# Patient Record
Sex: FEMALE | Race: BLACK OR AFRICAN AMERICAN | NOT HISPANIC OR LATINO | URBAN - METROPOLITAN AREA
[De-identification: names, ages, dates, MRNs, and addresses within clinical notes are randomized per-mention and may not be internally consistent; named-entity substitution may affect disease eponyms.]

---

## 2019-06-19 ENCOUNTER — OUTPATIENT (OUTPATIENT)
Dept: OUTPATIENT SERVICES | Facility: HOSPITAL | Age: 44
LOS: 1 days | Discharge: HOME | End: 2019-06-19

## 2019-06-19 DIAGNOSIS — Z02.1 ENCOUNTER FOR PRE-EMPLOYMENT EXAMINATION: ICD-10-CM

## 2021-02-25 ENCOUNTER — EMERGENCY (EMERGENCY)
Facility: HOSPITAL | Age: 46
LOS: 0 days | Discharge: HOME | End: 2021-02-25
Attending: EMERGENCY MEDICINE | Admitting: EMERGENCY MEDICINE
Payer: COMMERCIAL

## 2021-02-25 VITALS
RESPIRATION RATE: 18 BRPM | TEMPERATURE: 97 F | HEART RATE: 97 BPM | OXYGEN SATURATION: 98 % | DIASTOLIC BLOOD PRESSURE: 98 MMHG | SYSTOLIC BLOOD PRESSURE: 175 MMHG | WEIGHT: 160.06 LBS

## 2021-02-25 VITALS
HEART RATE: 86 BPM | DIASTOLIC BLOOD PRESSURE: 86 MMHG | RESPIRATION RATE: 18 BRPM | OXYGEN SATURATION: 100 % | SYSTOLIC BLOOD PRESSURE: 164 MMHG

## 2021-02-25 DIAGNOSIS — R51.9 HEADACHE, UNSPECIFIED: ICD-10-CM

## 2021-02-25 PROCEDURE — 99284 EMERGENCY DEPT VISIT MOD MDM: CPT

## 2021-02-25 RX ORDER — SODIUM CHLORIDE 9 MG/ML
1000 INJECTION, SOLUTION INTRAVENOUS ONCE
Refills: 0 | Status: COMPLETED | OUTPATIENT
Start: 2021-02-25 | End: 2021-02-25

## 2021-02-25 RX ORDER — METOCLOPRAMIDE HCL 10 MG
1 TABLET ORAL
Qty: 21 | Refills: 0
Start: 2021-02-25 | End: 2021-03-03

## 2021-02-25 RX ORDER — METOCLOPRAMIDE HCL 10 MG
10 TABLET ORAL ONCE
Refills: 0 | Status: COMPLETED | OUTPATIENT
Start: 2021-02-25 | End: 2021-02-25

## 2021-02-25 RX ORDER — DIPHENHYDRAMINE HCL 50 MG
25 CAPSULE ORAL ONCE
Refills: 0 | Status: COMPLETED | OUTPATIENT
Start: 2021-02-25 | End: 2021-02-25

## 2021-02-25 RX ORDER — KETOROLAC TROMETHAMINE 30 MG/ML
15 SYRINGE (ML) INJECTION ONCE
Refills: 0 | Status: DISCONTINUED | OUTPATIENT
Start: 2021-02-25 | End: 2021-02-25

## 2021-02-25 RX ADMIN — Medication 10 MILLIGRAM(S): at 05:05

## 2021-02-25 RX ADMIN — Medication 15 MILLIGRAM(S): at 05:05

## 2021-02-25 RX ADMIN — SODIUM CHLORIDE 1000 MILLILITER(S): 9 INJECTION, SOLUTION INTRAVENOUS at 05:04

## 2021-02-25 RX ADMIN — Medication 25 MILLIGRAM(S): at 05:05

## 2021-02-25 NOTE — ED PROVIDER NOTE - PATIENT PORTAL LINK FT
You can access the FollowMyHealth Patient Portal offered by [facility] by registering at the following website: [email or web address]. By joining [employer]’s FollowMyHealth portal, you will also be able to view your health information using other applications (apps) compatible with our system.

## 2021-02-25 NOTE — ED PROVIDER NOTE - CARE PROVIDER_API CALL
Narendra Holcomb)  EEGEpilepsy; Neurology  [address] [place] [address], [address]  [place] [address]  Phone: [phone]  Fax: [phone]  Follow Up Time:

## 2021-02-25 NOTE — ED PROVIDER NOTE - NS ED ROS FT
Constitutional:  No fevers or chills.  Eyes:  No visual changes.  ENT:  No hearing changes. No sore throat.  Neck:  No neck pain or stiffness.  Cardiac:  No CP or edema.  Resp:  No cough or SOB.   GI:  Mild nausea. No vomiting, diarrhea, or abdominal pain.  :  No dysuria, frequency, or hematuria.  MSK:  No myalgias or joint pain/swelling.  Neuro:  +HA. No dizziness or weakness.  Skin:  No skin rash.

## 2021-02-25 NOTE — ED PROVIDER NOTE - ATTENDING CONTRIBUTION TO CARE
45F no pmh p/w HA x 3d. Mild bifrontal HA, waxing/waning, accomp by mild nausea & photophobia, partially relieved w/excedrin. Rpts h/o similar HAs x many years, has never seen a neurologist. Denies f/c, uri sx, vision changes, cp/sob, abd pain, focal numbness or weakness. No early morning sx. No falls or head trauma.    PE:  nad  skin warm, dry  ncat  neck supple  rrr nl s1s2 no mrg  ctab no wrr  abd soft ntnd no palpable masses no rgr  back non-tender no cvat  ext no cce dpi  neuro aaox3, cn 2-12 intact bl no nystagmus or facial droop, 5/5 strength x 4 no drift, gross sensation intact, finger-nose nl, gait nl, romberg neg

## 2021-02-25 NOTE — ED PROVIDER NOTE - OBJECTIVE STATEMENT
46yo F 46yo F with PMH of HAs 44yo F with PMH of HAs presenting to ED with mild frontal HA x 1-2 days. Patient state she takes Excedrin with HA relief although it did not fully relieve her symptoms tonight so came to ED. Not worst HA of life, gradual, throbbing, mild to moderate. Mild nausea, mild photophobia/photosensitivity. No vision changes, HA, CP, SOB, v/d, abd pain, back pain, neck pain/rigidity, paresthesias, focal weakness, or injuries/traumas/falls/syncope. Nonsmoker.

## 2021-02-25 NOTE — ED PROVIDER NOTE - NSFOLLOWUPINSTRUCTIONS_ED_ALL_ED_FT
Please follow-up with the neurologist provided, call today to make an appointment.      Headache    A headache is pain or discomfort felt around the head or neck area. The specific cause of a headache may not be found as there are many types including tension headaches, migraine headaches, and cluster headaches. Watch your condition for any changes. Things you can do to manage your pain include taking over the counter and prescription medications as instructed by your health care provider, lying down in a dark quiet room, limiting stress, getting regular sleep, and refraining from alcohol and tobacco products.    SEEK IMMEDIATE MEDICAL CARE IF YOU HAVE ANY OF THE FOLLOWING SYMPTOMS: fever, vomiting, stiff neck, loss of vision, problems with speech, muscle weakness, loss of balance, trouble walking, passing out, or confusion.

## 2021-02-25 NOTE — ED PROVIDER NOTE - PROGRESS NOTE DETAILS
Canelo- Patient much improved after medications. Sent rx for Reglan to pt's pharmacy and given neuro f/u. Strict return precaution signs/sxs discussed. Full DC instructions and precaution signs and symptoms discussed. Proper follow up ensured.  Medications administered and prescribed/OTC home meds discussed.  All questions and concerns from patient addressed.  Understanding of instructions verbalized. Canelo- Patient improved after medications, pain fully resolved. Sent rx for Reglan to pt's pharmacy and given neuro f/u. Strict return precaution signs/sxs discussed. Full DC instructions and precaution signs and symptoms discussed. Proper follow up ensured.  Medications administered and prescribed/OTC home meds discussed.  All questions and concerns from patient addressed.  Understanding of instructions verbalized.

## 2021-02-26 PROBLEM — Z00.00 ENCOUNTER FOR PREVENTIVE HEALTH EXAMINATION: Status: ACTIVE | Noted: 2021-02-26

## 2021-05-18 ENCOUNTER — APPOINTMENT (OUTPATIENT)
Dept: OBGYN | Facility: CLINIC | Age: 46
End: 2021-05-18

## 2021-09-22 ENCOUNTER — APPOINTMENT (OUTPATIENT)
Dept: NEUROLOGY | Facility: CLINIC | Age: 46
End: 2021-09-22
Payer: COMMERCIAL

## 2021-09-22 VITALS
DIASTOLIC BLOOD PRESSURE: 93 MMHG | TEMPERATURE: 97.1 F | SYSTOLIC BLOOD PRESSURE: 147 MMHG | BODY MASS INDEX: 29.99 KG/M2 | HEART RATE: 82 BPM | WEIGHT: 180 LBS | HEIGHT: 65 IN

## 2021-09-22 DIAGNOSIS — R51.9 HEADACHE, UNSPECIFIED: ICD-10-CM

## 2021-09-22 DIAGNOSIS — Z78.9 OTHER SPECIFIED HEALTH STATUS: ICD-10-CM

## 2021-09-22 PROCEDURE — 99203 OFFICE O/P NEW LOW 30 MIN: CPT

## 2021-09-22 RX ORDER — BUTALBITAL, ACETAMINOPHEN AND CAFFEINE 300; 50; 40 MG/1; MG/1; MG/1
50-300-40 CAPSULE ORAL TWICE DAILY
Qty: 28 | Refills: 2 | Status: ACTIVE | COMMUNITY
Start: 2021-09-22 | End: 1900-01-01
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
## 2021-09-22 NOTE — DISCUSSION/SUMMARY
[FreeTextEntry1] : Ms. Geronimo is a 45yo woman with headaches which are severe requiring ED visits for the last 2 years.  Headaches have features suggestive of migraines however as they donot occur more then 1-2 days per month would only offer abortive medications. Will also obtain imaging of brain to rule out any other etiologies triggering her HA\par 1. MRI brain w/o MARI\par 2. Fioricet PRN\par 3. f/u in 6 months\par 4. Headache diary

## 2023-04-10 ENCOUNTER — NON-APPOINTMENT (OUTPATIENT)
Age: 48
End: 2023-04-10

## 2023-04-10 ENCOUNTER — APPOINTMENT (OUTPATIENT)
Dept: ORTHOPEDIC SURGERY | Facility: CLINIC | Age: 48
End: 2023-04-10
Payer: OTHER MISCELLANEOUS

## 2023-04-10 VITALS — WEIGHT: 180 LBS | BODY MASS INDEX: 29.99 KG/M2 | HEIGHT: 65 IN

## 2023-04-10 DIAGNOSIS — Z78.9 OTHER SPECIFIED HEALTH STATUS: ICD-10-CM

## 2023-04-10 DIAGNOSIS — S73.102A UNSPECIFIED SPRAIN OF LEFT HIP, INITIAL ENCOUNTER: ICD-10-CM

## 2023-04-10 PROCEDURE — 99203 OFFICE O/P NEW LOW 30 MIN: CPT | Mod: ACP

## 2023-04-10 PROCEDURE — 73140 X-RAY EXAM OF FINGER(S): CPT | Mod: LT

## 2023-04-10 PROCEDURE — 73030 X-RAY EXAM OF SHOULDER: CPT | Mod: LT

## 2023-04-10 PROCEDURE — 73502 X-RAY EXAM HIP UNI 2-3 VIEWS: CPT

## 2023-04-10 RX ORDER — TIZANIDINE 4 MG/1
4 TABLET ORAL
Qty: 30 | Refills: 0 | Status: ACTIVE | COMMUNITY
Start: 2023-04-10 | End: 1900-01-01

## 2023-04-10 RX ORDER — NABUMETONE 500 MG/1
500 TABLET, FILM COATED ORAL
Qty: 60 | Refills: 0 | Status: ACTIVE | COMMUNITY
Start: 2023-04-10 | End: 1900-01-01

## 2023-04-10 NOTE — IMAGING
[de-identified] : On examination of her left shoulder she is tender over the anterior glenohumeral joint.  She is nontender over the AC joint or clavicle.  She is nontender over the trapezius muscle or the scapula.  She has good range of motion of the shoulder with pain.  Negative drop arm, positive speeds, positive impingement.  Negative Lin, she has 4-5 strength, sensation is intact throughout, 2+ radial pulse.  Full range of motion of the left elbow wrist and hand.  No swelling or ecchymosis of the left pinky finger.  There is mild tenderness to palpation over the PIP joint.  He is able to flex and extend at the MCP PIP and DIP joints.  There is no rotational deformity.\par Examination of her left hip she has mild tenderness to palpation over the groin and the greater trochanter.  Negative logroll.  She has full range of motion of the hip, pain with internal rotation.  No tenderness over the lumbar spine or the paraspinal muscles.  She is able to straight leg raise against resistance without pain.  She has 5 out of 5 strength in the lower extremities, sensation is intact throughout the lower extremities, no saddle anesthesia.  She is walking with a mildly antalgic gait.\par \par X-rays taken in the office today of the left shoulder show no acute fractures, dislocations, or other bony abnormalities.\par X-rays taken in the office today of the left hip show no acute fractures, dislocations, or other bony abnormalities.\par X-rays taken in the office today of the left pinky finger show no acute fractures, dislocations, or other bony abnormality

## 2023-04-10 NOTE — DISCUSSION/SUMMARY
[de-identified] : At this time I recommend she rest, ice and alternate with warm compresses.  I sent a prescription for an anti-inflammatory and a muscle relaxant to the pharmacy.  I would like her to start doing some physical therapy.  We will see her back in a few weeks for repeat evaluation to make sure she is improving. Patient will call me if any other problems or concerns.  Patient verbalized understanding and agreed with the plan, all questions were answered in the office today.\par \par This is a Worker's Compensation case, she is unable to work at this time.  She is currently on temporary total disability.\par Patient was seen under the supervision of Dr. Vasques.

## 2023-04-10 NOTE — HISTORY OF PRESENT ILLNESS
[de-identified] : 48-year-old female is here today for evaluation of her left shoulder left hip and left pinky finger.  Patient states she was at work, she works in a group home, she states she was going down the steps to do the laundry and she fell down a flight of steps.  She states she fell down about 10 steps.  He went to [place] and had x-rays taken and was advised to follow-up here.  States he took x-rays of her finger and her left knee but she states she is having pain now mostly in her left shoulder and left hip.  She states she feels the pain in her left hip radiating down her left leg.  She still has some mild pain in the left pinky finger.  She has not been taking anything for the pain.  She denies any lower back pain, she denies any numbness tingling in the legs.

## 2023-05-01 ENCOUNTER — APPOINTMENT (OUTPATIENT)
Dept: ORTHOPEDIC SURGERY | Facility: CLINIC | Age: 48
End: 2023-05-01
Payer: OTHER MISCELLANEOUS

## 2023-05-01 ENCOUNTER — NON-APPOINTMENT (OUTPATIENT)
Age: 48
End: 2023-05-01

## 2023-05-01 DIAGNOSIS — S43.492A OTHER SPRAIN OF LEFT SHOULDER JOINT, INITIAL ENCOUNTER: ICD-10-CM

## 2023-05-01 PROCEDURE — 99214 OFFICE O/P EST MOD 30 MIN: CPT

## 2023-05-01 RX ORDER — MELOXICAM 15 MG/1
15 TABLET ORAL DAILY
Qty: 30 | Refills: 1 | Status: ACTIVE | COMMUNITY
Start: 2023-05-01 | End: 1900-01-01

## 2023-05-01 NOTE — HISTORY OF PRESENT ILLNESS
[de-identified] : CC Left shoulder \par \par 48 year old female presents left shoulder pain. Pt is out of work. Pt states the injury happened at work, which occurred on 04/06/2023, she works in a group home. Pt states she fell down the stairs going to do laundry, which the staircase had 10 steps. \par \par on exam left hip nontender with axial loading and rotation left pinky full range of motion nontender no swelling\par \par Left shoulder guarded range of motion pain with cuff resistance impingement and Mills's\par \par recommending a formal physical therapy with a home program, an MRI of the left shoulder as well as an anti-inflammatory.  She is unable to work in a percent temporary disabled we will see her back in 4 to 6 weeks

## 2023-05-08 ENCOUNTER — FORM ENCOUNTER (OUTPATIENT)
Age: 48
End: 2023-05-08

## 2023-05-17 ENCOUNTER — NON-APPOINTMENT (OUTPATIENT)
Age: 48
End: 2023-05-17

## 2023-06-15 ENCOUNTER — NON-APPOINTMENT (OUTPATIENT)
Age: 48
End: 2023-06-15

## 2023-06-15 ENCOUNTER — APPOINTMENT (OUTPATIENT)
Dept: ORTHOPEDIC SURGERY | Facility: CLINIC | Age: 48
End: 2023-06-15
Payer: OTHER MISCELLANEOUS

## 2023-06-15 DIAGNOSIS — M75.102 UNSPECIFIED ROTATOR CUFF TEAR OR RUPTURE OF LEFT SHOULDER, NOT SPECIFIED AS TRAUMATIC: ICD-10-CM

## 2023-06-15 PROCEDURE — 99213 OFFICE O/P EST LOW 20 MIN: CPT | Mod: ACP,25

## 2023-06-15 PROCEDURE — 20610 DRAIN/INJ JOINT/BURSA W/O US: CPT | Mod: LT

## 2023-06-15 NOTE — PROCEDURE
[Large Joint Injection] : Large joint injection [Left] : of the left [Shoulder] : shoulder [Pain] : pain [Alcohol] : alcohol [Sterile technique used] : sterile technique used [Ethyl Chloride sprayed topically] : ethyl chloride sprayed topically [____] : [unfilled] [] : Patient tolerated procedure well [Call if redness, pain or fever occur] : call if redness, pain or fever occur [Risks, benefits, alternatives discussed / Verbal consent obtained] : the risks benefits, and alternatives have been discussed, and verbal consent was obtained [Apply ice for 15min out of every hour for the next 12-24 hours as tolerated] : apply ice for 15 minutes out of every hour for the next 12-24 hours as tolerated

## 2023-06-15 NOTE — IMAGING
[de-identified] : Left shoulder MRI from 5/9/2023 reviewed with the patient in detail.  It revealed the following:\par -Moderate rotator cuff tendinosis/strain and subacromial/subdeltoid bursitis\par -High-grade partial-thickness tear supraspinatus measuring 15 x 17 mm\par -Appearance consistent with SLAP tear with extension to the long head biceps tendon\par -Mild changes of AC osteoarthritis with spurring\par -Subacromial spur.

## 2023-06-15 NOTE — PHYSICAL EXAM
[Left] : left shoulder [Sitting] : sitting [4 ___] : forward flexion 4[unfilled]/5 [4___] : internal rotation 4[unfilled]/5 [] : no deformity [TWNoteComboBox7] : active forward flexion 140 degrees [TWNoteComboBox4] : passive forward flexion 170 degrees [de-identified] : active abduction 140 degrees [TWNoteComboBox9] : passive abduction 170 degrees [TWNoteComboBox6] : internal rotation L1 [de-identified] : external rotation 85 degrees

## 2023-06-15 NOTE — DISCUSSION/SUMMARY
[de-identified] : Patient will continue taking nabumetone as needed for pain.  The patient was advised to rest/ice the area and may alternate with warm compresses as needed. She will continue formal physical therapy.\par \par With the patient's approval, and under sterile technique, I performed a steroid injection today.  See the attached procedure note for further details.  The patient was informed that their next cortisone injection could not be until a minimum of three months from today's date and the patient understands.  Explained to the patient that the full effect of the injection will take 3-5 days to kick in.\par \par Explained MRI results in detail.  Operative versus nonoperative treatments were discussed in detail.  Patient is interested in arthroscopic shoulder surgery.  SHe has a 100% temporary total disability and will be out of work until her next evaluation.  She will follow-up with Dr. Garvey in 4 weeks for surgical consultation.  All of the patient's questions/concerns were answered in detail.

## 2023-06-15 NOTE — HISTORY OF PRESENT ILLNESS
[de-identified] : Patient is a 48-year-old female who reports to the office for subsequent reevaluation of her left shoulder pain.  She had an MRI done and would like to go over the results with that.  Range of motion and palpating certain areas of the shoulder still aggravate the patient's pain.  Denies any numbness or tingling.

## 2023-06-20 ENCOUNTER — FORM ENCOUNTER (OUTPATIENT)
Age: 48
End: 2023-06-20

## 2023-07-10 ENCOUNTER — APPOINTMENT (OUTPATIENT)
Dept: ORTHOPEDIC SURGERY | Facility: CLINIC | Age: 48
End: 2023-07-10

## 2023-07-21 ENCOUNTER — APPOINTMENT (OUTPATIENT)
Dept: ORTHOPEDIC SURGERY | Facility: CLINIC | Age: 48
End: 2023-07-21
Payer: OTHER MISCELLANEOUS

## 2023-07-21 VITALS — HEIGHT: 65 IN | BODY MASS INDEX: 29.99 KG/M2 | WEIGHT: 180 LBS

## 2023-07-21 PROCEDURE — 99214 OFFICE O/P EST MOD 30 MIN: CPT

## 2023-07-21 NOTE — HISTORY OF PRESENT ILLNESS
[de-identified] : Patient is here today for evaluation 3 and half months from her injury she has had 2 different anti-inflammatories prescription strength, as well as physical therapy with no relief MRI with a SLAP tear and partial cuff tear\par \par Left shoulder has pain with impingement cuff resistance and Frankfort's guarded range of motion active assistive I can get her to nearly full\par \par Diagnoses left shoulder SLAP tear partial cuff tear impingement\par \par She she has failed nonoperative management of even anti-inflammatories recommend surgical intervention discussed arthroscopy in detail she understands all risk and benefits and we will send out for the physician for left shoulder arthroscopy possible cuff repair possible open biceps tenodesis decompression debridement she is unable to work in a percent temporary disabled\par \par Surgical Discussion (general)\par \par The patient was advised of the diagnosis.  The natural history of the pathology was explained in full to the patient in layman's terms. All questions were answered.  The risks and benefits of surgical and non-surgical treatment alternatives were explained in full to the patient. \par \par The patient demonstrated a full understanding of the surgical and non-surgical options.  The risks of surgery were outlined in full to the patient including but not limited to bleeding, scarring, infection, sepsis, neurologic injury, vascular injury, failure to resolve symptoms, symptom recurrence, the need for further surgery, non-healing, wound breakdown, deep vein thrombosis, pulmonary embolism, spontaneous osteonecrosis, anesthesia complications and even death.  The patient understood all the risks and accepted them and understood that other complications could occur that are not mentioned above.  The intraoperative plan, post-operative plan, post-operative expectations and limitations were explained in full.  Expectations from non-surgical treatment were explained in full as well.  The patient demonstrated a complete understanding of the treatment alternatives and requested the above-mentioned procedure.  This will be scheduled accordingly.\par

## 2023-07-25 ENCOUNTER — FORM ENCOUNTER (OUTPATIENT)
Age: 48
End: 2023-07-25

## 2023-08-25 ENCOUNTER — APPOINTMENT (OUTPATIENT)
Dept: ORTHOPEDIC SURGERY | Facility: AMBULATORY SURGERY CENTER | Age: 48
End: 2023-08-25
Payer: OTHER MISCELLANEOUS

## 2023-08-25 PROCEDURE — 23430 REPAIR BICEPS TENDON: CPT | Mod: LT

## 2023-08-25 PROCEDURE — 29807 SHO ARTHRS SRG RPR SLAP LES: CPT | Mod: LT

## 2023-08-25 PROCEDURE — 29826 SHO ARTHRS SRG DECOMPRESSION: CPT | Mod: LT

## 2023-08-25 RX ORDER — OXYCODONE AND ACETAMINOPHEN 5; 325 MG/1; MG/1
5-325 TABLET ORAL
Qty: 30 | Refills: 0 | Status: ACTIVE | COMMUNITY
Start: 2023-08-25 | End: 1900-01-01

## 2023-08-25 RX ORDER — MELOXICAM 15 MG/1
15 TABLET ORAL
Qty: 30 | Refills: 0 | Status: ACTIVE | COMMUNITY
Start: 2023-08-25 | End: 1900-01-01

## 2023-08-31 ENCOUNTER — APPOINTMENT (OUTPATIENT)
Dept: ORTHOPEDIC SURGERY | Facility: CLINIC | Age: 48
End: 2023-08-31

## 2023-09-06 ENCOUNTER — APPOINTMENT (OUTPATIENT)
Dept: ORTHOPEDIC SURGERY | Facility: CLINIC | Age: 48
End: 2023-09-06

## 2023-09-08 ENCOUNTER — APPOINTMENT (OUTPATIENT)
Dept: ORTHOPEDIC SURGERY | Facility: CLINIC | Age: 48
End: 2023-09-08
Payer: OTHER MISCELLANEOUS

## 2023-09-08 ENCOUNTER — NON-APPOINTMENT (OUTPATIENT)
Age: 48
End: 2023-09-08

## 2023-09-08 ENCOUNTER — APPOINTMENT (OUTPATIENT)
Dept: ORTHOPEDIC SURGERY | Facility: CLINIC | Age: 48
End: 2023-09-08

## 2023-09-08 PROCEDURE — 99024 POSTOP FOLLOW-UP VISIT: CPT

## 2023-09-08 NOTE — HISTORY OF PRESENT ILLNESS
[de-identified] : Patient is a 48-year-old female who reports to the office for postop visit #1 status post left shoulder arthroscopy done on 8/25/2023 by Dr. Garvey.  She had a subacromial decompression/acromioplasty, extensive debridement of the glenohumeral joint of 3 separate structures, open biceps tenodesis lysis of adhesions and complete synovectomy.  She is doing well and her pain has been well controlled.  She will undergo early ROM of her shoulder with cuff and deltoid strengthening but no bicep strengthening.

## 2023-09-08 NOTE — DISCUSSION/SUMMARY
[de-identified] : Patient is doing well status post shoulder arthroscopy.  Her pain has been well controlled she will continue taking NSAIDs as needed for pain.  She may discontinue her sling.  Sutures were removed and incision sites were covered with Steri-Strips.  Explained to the patient that these will fall off on their own in the shower.  A script for physical therapy was printed for the patient so they can get started on that. early ROM of her shoulder with cuff and deltoid strengthening but no bicep strengthening.  She has a 100% temporary total disability and will be out of work until her next evaluation.  The patient will follow-up in 6 weeks for further evaluation.  All of the patient's questions/concerns were answered in detail.

## 2023-10-18 ENCOUNTER — NON-APPOINTMENT (OUTPATIENT)
Age: 48
End: 2023-10-18

## 2023-10-18 ENCOUNTER — APPOINTMENT (OUTPATIENT)
Dept: ORTHOPEDIC SURGERY | Facility: CLINIC | Age: 48
End: 2023-10-18
Payer: OTHER MISCELLANEOUS

## 2023-10-18 VITALS — BODY MASS INDEX: 31.49 KG/M2 | HEIGHT: 65 IN | WEIGHT: 189 LBS

## 2023-10-18 PROCEDURE — 99024 POSTOP FOLLOW-UP VISIT: CPT

## 2023-11-29 ENCOUNTER — APPOINTMENT (OUTPATIENT)
Dept: ORTHOPEDIC SURGERY | Facility: CLINIC | Age: 48
End: 2023-11-29
Payer: OTHER MISCELLANEOUS

## 2023-11-29 PROCEDURE — 99213 OFFICE O/P EST LOW 20 MIN: CPT | Mod: ACP

## 2023-11-29 RX ORDER — NABUMETONE 750 MG/1
750 TABLET, FILM COATED ORAL DAILY
Qty: 60 | Refills: 1 | Status: ACTIVE | COMMUNITY
Start: 2023-07-21 | End: 1900-01-01

## 2024-01-15 ENCOUNTER — APPOINTMENT (OUTPATIENT)
Dept: ORTHOPEDIC SURGERY | Facility: CLINIC | Age: 49
End: 2024-01-15
Payer: OTHER MISCELLANEOUS

## 2024-01-15 DIAGNOSIS — Z98.890 OTHER SPECIFIED POSTPROCEDURAL STATES: ICD-10-CM

## 2024-01-15 PROCEDURE — 99213 OFFICE O/P EST LOW 20 MIN: CPT | Mod: ACP

## 2024-01-15 NOTE — DISCUSSION/SUMMARY
[de-identified] : At this point I recommend she continues with formal physical therapy for stretching strengthening and range of motion.  In my opinion she needs a therapy to increase range of motion and increase the strength of her rotator cuff and deltoid muscle as well as restore optimal function to her left shoulder.  She is unable to work, 100% temporarily disabled.  I will see her back in 4 weeks for further evaluation.

## 2024-01-15 NOTE — IMAGING
[de-identified] : Physical exam left shoulder: Tenderness to palpation over the left AC joint and proximal humerus.  Active range of motion with forward flexion to 150 degrees, passive range of motion with forward flexion to 180 degrees, active range of motion with abduction to 110 degrees, active range of motion with internal rotation L4.  She has pain and weakness rotator cuff resistance testing.  Intact to light touch.

## 2024-01-15 NOTE — HISTORY OF PRESENT ILLNESS
[de-identified] : The patient is a 48-year-old female here for a subsequent reevaluation of her left shoulder. She is status post left shoulder arthroscopy, subacromial decompression, acromioplasty, extensive debridement of the glenohumeral joint, open biceps tenodesis and lysis of adhesions with complete synovectomy on 8/25/2023.  She is 4-1/2 months out from her surgery.  She reports her shoulder is improving however she still has pain in the shoulder.  The patient is doing formal physical therapy at [facility].  She had an MARCELO exam on 12/20/2023.  She showed me that paperwork.

## 2024-02-12 ENCOUNTER — NON-APPOINTMENT (OUTPATIENT)
Age: 49
End: 2024-02-12

## 2024-02-12 ENCOUNTER — APPOINTMENT (OUTPATIENT)
Dept: ORTHOPEDIC SURGERY | Facility: CLINIC | Age: 49
End: 2024-02-12
Payer: OTHER MISCELLANEOUS

## 2024-02-12 PROCEDURE — 99213 OFFICE O/P EST LOW 20 MIN: CPT | Mod: ACP

## 2024-02-12 NOTE — HISTORY OF PRESENT ILLNESS
[de-identified] : The patient is a 48-year-old female here for a subsequent reevaluation of her left shoulder. She is status post left shoulder arthroscopy, subacromial decompression, acromioplasty, extensive debridement of the glenohumeral joint, open biceps tenodesis and lysis of adhesions with complete synovectomy on 8/25/2023.  She is 5-1/2 months out from her surgery.  She reports her shoulder is improving however she still has pain in the shoulder.  She states her shoulder is feeling much better.  She is doing formal physical therapy at [place] and reports increased range of motion.

## 2024-02-12 NOTE — DISCUSSION/SUMMARY
[de-identified] : At this point I recommend she continues with formal physical therapy for stretching strengthening and range of motion.  In my opinion she needs a therapy to increase range of motion and increase the strength of her rotator cuff and deltoid muscle as well as restore optimal function to her left shoulder.  She has improved since her last office visit here with more range of motion and in my opinion she will continue to improve.  She is unable to work, 100% temporarily disabled.  I will see her back in 6 weeks for further evaluation.

## 2024-02-12 NOTE — IMAGING
[de-identified] : Physical exam left shoulder: Tenderness to palpation over the left AC joint and proximal humerus.  Active range of motion with forward flexion to 165 degrees, passive range of motion with forward flexion to 180 degrees, active range of motion with abduction to 130 degrees, active range of motion with internal rotation L3.  She has improved strength with rotator cuff resistance testing.  Intact to light touch.

## 2024-03-19 ENCOUNTER — APPOINTMENT (OUTPATIENT)
Dept: ORTHOPEDIC SURGERY | Facility: CLINIC | Age: 49
End: 2024-03-19
Payer: OTHER MISCELLANEOUS

## 2024-03-19 ENCOUNTER — NON-APPOINTMENT (OUTPATIENT)
Age: 49
End: 2024-03-19

## 2024-03-19 PROCEDURE — 99213 OFFICE O/P EST LOW 20 MIN: CPT | Mod: ACP

## 2024-03-19 NOTE — IMAGING
[de-identified] : Physical exam left shoulder: Tenderness to palpation over the left AC joint and proximal humerus.  Active range of motion with forward flexion to 170 degrees, active range of motion with abduction to 150 degrees, active range of motion with internal rotation L3.  She has improved strength with rotator cuff resistance testing.  Intact to light touch.

## 2024-03-19 NOTE — HISTORY OF PRESENT ILLNESS
[de-identified] : The patient is a 48-year-old female here for a subsequent reevaluation of her left shoulder. She is status post left shoulder arthroscopy, subacromial decompression, acromioplasty, extensive debridement of the glenohumeral joint, open biceps tenodesis and lysis of adhesions with complete synovectomy on 8/25/2023.  She is almost 7 months out from her surgery.  She reports her shoulder feeling much better.  She states formal physical therapy was approved and she is going to return to that.  She goes to [place] for the therapy.

## 2024-03-19 NOTE — DISCUSSION/SUMMARY
[de-identified] : At this point I recommend she returns to formal physical therapy for stretching, strengthening and range of motion.  She would like to return back to work.  She can return back to work on 3/25/2024 full duty, with no restrictions, mild degree of disability

## 2024-06-04 ENCOUNTER — APPOINTMENT (OUTPATIENT)
Dept: ORTHOPEDIC SURGERY | Facility: CLINIC | Age: 49
End: 2024-06-04
Payer: OTHER MISCELLANEOUS

## 2024-06-04 ENCOUNTER — NON-APPOINTMENT (OUTPATIENT)
Age: 49
End: 2024-06-04

## 2024-06-04 DIAGNOSIS — S43.432D SUPERIOR GLENOID LABRUM LESION OF LEFT SHOULDER, SUBSEQUENT ENCOUNTER: ICD-10-CM

## 2024-06-04 PROCEDURE — 99213 OFFICE O/P EST LOW 20 MIN: CPT | Mod: ACP

## 2024-06-04 NOTE — DISCUSSION/SUMMARY
[de-identified] : At this point I recommend she returns to formal physical therapy for the left shoulder, please send authorization for that.  She has lost range of motion from her previous office visit here in my opinion the benefit from returning to formal physical therapy for range of motion and strengthening the rotator cuff and deltoid.  She is working full duty, mild degree of disability.  I will see her back in 2 months for further evaluation.

## 2024-06-04 NOTE — IMAGING
[de-identified] : Physical exam left shoulder: Tenderness to palpation over the left AC joint and proximal humerus.  Active range of motion with forward flexion to 120 degrees, passive range of motion forward flexion to 180 degrees.  Active range of motion with abduction to 90 degrees, active range of motion with internal rotation L3.  She has some weakness with rotator cuff resistance testing.  Intact to light touch.

## 2024-06-04 NOTE — HISTORY OF PRESENT ILLNESS
[de-identified] : The patient is a 49-year-old female here for a subsequent reevaluation of her left shoulder. She is status post left shoulder arthroscopy, subacromial decompression, acromioplasty, extensive debridement of the glenohumeral joint, open biceps tenodesis and lysis of adhesions with complete synovectomy on 8/25/2023.  She has good and bad days with her shoulder.  She has not been doing formal physical therapy.  She is working full duty.

## 2024-08-06 ENCOUNTER — APPOINTMENT (OUTPATIENT)
Dept: ORTHOPEDIC SURGERY | Facility: CLINIC | Age: 49
End: 2024-08-06

## 2024-08-28 ENCOUNTER — APPOINTMENT (OUTPATIENT)
Dept: ORTHOPEDIC SURGERY | Facility: CLINIC | Age: 49
End: 2024-08-28
Payer: OTHER MISCELLANEOUS

## 2024-08-28 VITALS — HEIGHT: 65 IN | BODY MASS INDEX: 29.99 KG/M2 | WEIGHT: 180 LBS

## 2024-08-28 DIAGNOSIS — S43.432D SUPERIOR GLENOID LABRUM LESION OF LEFT SHOULDER, SUBSEQUENT ENCOUNTER: ICD-10-CM

## 2024-08-28 PROCEDURE — 99245 OFF/OP CONSLTJ NEW/EST HI 55: CPT

## 2024-08-28 NOTE — HISTORY OF PRESENT ILLNESS
[de-identified] : Patient is here for evaluation of her left shoulder for schedule loss use has been a year since her surgery  Physical exam left shoulder goes forward flexion to 135 degrees abduction 135 degrees internal rotation 55 external rotation 75 extension 60, adduction 15  Due to the loss of forward flexion and abduction given 20% with additional 7.5% for total of 27.5 % loss for the left shoulder, currently working full duty mildly disabled follow-up as needed